# Patient Record
(demographics unavailable — no encounter records)

---

## 2025-04-30 NOTE — ASSESSMENT
[FreeTextEntry1] : #Skin papules medial upper eyelids -Favor early xanthelasma vs early hidrocystomas  -Disc monitoring lipid levels with PCP -Can trial cautery, pt defers today -RTC for significant change/growth  #Eczematous dermatitis, #Xerosis, #PIH -Chronic issues, not at treatment goal -Disc OTC moisturization -If eczema flaring can use triamcinolone oint though recently doing well with just Aquaphor -Disc hydroquinone trial for PIH including side effects, pt defers for now  RTC prn

## 2025-04-30 NOTE — HISTORY OF PRESENT ILLNESS
[FreeTextEntry1] : RP bumps, dark spots [de-identified] : RP Bumps over medial eyes- noticed about 2 months ago, feels like wax and wane in size, asx Opthalmologist prescribed erythromycin oint which didn't help Of note, was told last PCP visit cholesterol levels were elevated, unsure of amount   Also with eczema- well controlled recently but leaving behind dark spots

## 2025-04-30 NOTE — PHYSICAL EXAM
[FreeTextEntry3] : Yellowish to skin colored small papules b/l medial upper eyelids Xerosis lower legs admixed with hyperpigmented patches

## 2025-07-18 NOTE — PHYSICAL EXAM
[FreeTextEntry3] : Focused skin exam per patient preference: - b/l axilla with multiple subcutaneous nodules, no erythema, nontender

## 2025-07-18 NOTE — HISTORY OF PRESENT ILLNESS
[FreeTextEntry1] : lumps under arms [de-identified] : 71 year F presents for lumps under right arm for 2 weeks and then lumps under left arm for 1 day. Denies history of lumps or boils in armpits or groin.